# Patient Record
Sex: FEMALE | Race: BLACK OR AFRICAN AMERICAN | NOT HISPANIC OR LATINO | Employment: STUDENT | URBAN - METROPOLITAN AREA
[De-identification: names, ages, dates, MRNs, and addresses within clinical notes are randomized per-mention and may not be internally consistent; named-entity substitution may affect disease eponyms.]

---

## 2019-05-23 ENCOUNTER — OFFICE VISIT (OUTPATIENT)
Dept: FAMILY MEDICINE | Facility: CLINIC | Age: 34
End: 2019-05-23
Payer: MEDICAID

## 2019-05-23 VITALS
HEART RATE: 85 BPM | SYSTOLIC BLOOD PRESSURE: 120 MMHG | DIASTOLIC BLOOD PRESSURE: 80 MMHG | TEMPERATURE: 98 F | WEIGHT: 160 LBS | HEIGHT: 64 IN | BODY MASS INDEX: 27.31 KG/M2 | RESPIRATION RATE: 16 BRPM | OXYGEN SATURATION: 99 %

## 2019-05-23 DIAGNOSIS — F60.3 BORDERLINE PERSONALITY DISORDER: ICD-10-CM

## 2019-05-23 DIAGNOSIS — E78.5 HYPERLIPIDEMIA, UNSPECIFIED HYPERLIPIDEMIA TYPE: ICD-10-CM

## 2019-05-23 DIAGNOSIS — E11.9 TYPE 2 DIABETES MELLITUS WITHOUT COMPLICATION, WITHOUT LONG-TERM CURRENT USE OF INSULIN: ICD-10-CM

## 2019-05-23 DIAGNOSIS — F41.9 ANXIETY: ICD-10-CM

## 2019-05-23 DIAGNOSIS — E03.9 HYPOTHYROIDISM, UNSPECIFIED TYPE: ICD-10-CM

## 2019-05-23 DIAGNOSIS — I10 ESSENTIAL HYPERTENSION: ICD-10-CM

## 2019-05-23 DIAGNOSIS — F31.9 BIPOLAR 1 DISORDER: ICD-10-CM

## 2019-05-23 DIAGNOSIS — E53.8 VITAMIN B 12 DEFICIENCY: ICD-10-CM

## 2019-05-23 DIAGNOSIS — F17.200 SMOKER: ICD-10-CM

## 2019-05-23 DIAGNOSIS — Z76.89 ENCOUNTER TO ESTABLISH CARE: Primary | ICD-10-CM

## 2019-05-23 DIAGNOSIS — E55.9 VITAMIN D DEFICIENCY: ICD-10-CM

## 2019-05-23 PROCEDURE — 99203 OFFICE O/P NEW LOW 30 MIN: CPT | Mod: S$GLB,,, | Performed by: FAMILY MEDICINE

## 2019-05-23 PROCEDURE — 99203 PR OFFICE/OUTPT VISIT, NEW, LEVL III, 30-44 MIN: ICD-10-PCS | Mod: S$GLB,,, | Performed by: FAMILY MEDICINE

## 2019-05-23 NOTE — PROGRESS NOTES
Subjective:       Patient ID: Marcie García is a 33 y.o. female.    Chief Complaint: Establish Care    34 yo F here to establish care. Pt had not been seen by PCP for many years. She did have a psychiatrist but she is not medicated. Pt has anxiety and bipolar and personality disorder. Pt would like a referral.  Pt has no other pMH or meds. She does smoke though. I told her not to smoke and she said she wants to quit.   Pt feels well but she thinks maybe her body is changing somewhat. She will get the labs done and go from there.    Review of Systems   Constitutional: Negative for activity change, chills, fatigue, fever and unexpected weight change.   HENT: Negative for ear pain, rhinorrhea and trouble swallowing.    Eyes: Negative for pain.   Respiratory: Negative for cough, chest tightness, shortness of breath and wheezing.    Cardiovascular: Negative for chest pain and palpitations.   Gastrointestinal: Negative for abdominal distention, abdominal pain, constipation, diarrhea, nausea and vomiting.   Endocrine: Negative for cold intolerance and heat intolerance.   Genitourinary: Negative for dysuria, frequency and urgency.   Musculoskeletal: Negative for myalgias.   Skin: Negative for rash.   Neurological: Negative for dizziness, syncope, light-headedness and headaches.   Hematological: Does not bruise/bleed easily.   Psychiatric/Behavioral: Negative for agitation and confusion.       Objective:      Physical Exam   Constitutional: She appears well-developed.   HENT:   Right Ear: External ear normal.   Left Ear: External ear normal.   Mouth/Throat: Oropharynx is clear and moist.   Eyes: Conjunctivae and EOM are normal.   Neck: Normal range of motion.   Cardiovascular: Normal rate, regular rhythm and intact distal pulses.   Pulmonary/Chest: Effort normal and breath sounds normal.   Abdominal: Soft.   Skin: Skin is warm. Capillary refill takes less than 2 seconds.   Psychiatric: She has a normal mood and affect.        Assessment:       1. Encounter to establish care    2. Smoker    3. Bipolar 1 disorder    4. Anxiety    5. Borderline personality disorder    6. Hyperlipidemia, unspecified hyperlipidemia type    7. Essential hypertension    8. Vitamin D deficiency    9. Type 2 diabetes mellitus without complication, without long-term current use of insulin    10. Hypothyroidism, unspecified type    11. Vitamin B 12 deficiency        Plan:       PROBLEM LIST     Marcie was seen today for establish care.    Diagnoses and all orders for this visit:    Encounter to establish care    Smoker    Bipolar 1 disorder  -     Ambulatory Referral to Psychiatry    Anxiety  -     Ambulatory Referral to Psychiatry    Borderline personality disorder  -     Ambulatory Referral to Psychiatry    Hyperlipidemia, unspecified hyperlipidemia type  -     Lipid panel; Future  -     Lipid panel    Essential hypertension  -     CBC auto differential; Future  -     Comprehensive metabolic panel; Future  -     CBC auto differential  -     Comprehensive metabolic panel    Vitamin D deficiency  -     Vitamin D; Future  -     Vitamin D    Type 2 diabetes mellitus without complication, without long-term current use of insulin  -     Hemoglobin A1c; Future  -     Hemoglobin A1c    Hypothyroidism, unspecified type  -     TSH; Future  -     TSH    Vitamin B 12 deficiency  -     Vitamin B12; Future  -     Vitamin B12

## 2023-12-28 DIAGNOSIS — O09.92 HIGH-RISK PREGNANCY IN SECOND TRIMESTER: Primary | ICD-10-CM

## 2024-01-04 ENCOUNTER — OFFICE VISIT (OUTPATIENT)
Dept: MATERNAL FETAL MEDICINE | Facility: CLINIC | Age: 39
End: 2024-01-04
Payer: MEDICAID

## 2024-01-04 VITALS
BODY MASS INDEX: 34.83 KG/M2 | DIASTOLIC BLOOD PRESSURE: 64 MMHG | HEIGHT: 64 IN | RESPIRATION RATE: 20 BRPM | WEIGHT: 204 LBS | OXYGEN SATURATION: 98 % | HEART RATE: 97 BPM | SYSTOLIC BLOOD PRESSURE: 112 MMHG

## 2024-01-04 DIAGNOSIS — O09.92 HIGH-RISK PREGNANCY IN SECOND TRIMESTER: ICD-10-CM

## 2024-01-04 DIAGNOSIS — F31.9 BIPOLAR 1 DISORDER: Primary | ICD-10-CM

## 2024-01-04 DIAGNOSIS — F41.9 ANXIETY: ICD-10-CM

## 2024-01-04 DIAGNOSIS — O09.522 AMA (ADVANCED MATERNAL AGE) MULTIGRAVIDA 35+, SECOND TRIMESTER: ICD-10-CM

## 2024-01-04 DIAGNOSIS — F60.3 BORDERLINE PERSONALITY DISORDER: ICD-10-CM

## 2024-01-04 PROCEDURE — 99203 OFFICE O/P NEW LOW 30 MIN: CPT | Mod: TH,S$GLB,, | Performed by: OBSTETRICS & GYNECOLOGY

## 2024-01-04 PROCEDURE — 3074F SYST BP LT 130 MM HG: CPT | Mod: CPTII,S$GLB,, | Performed by: OBSTETRICS & GYNECOLOGY

## 2024-01-04 PROCEDURE — 3078F DIAST BP <80 MM HG: CPT | Mod: CPTII,S$GLB,, | Performed by: OBSTETRICS & GYNECOLOGY

## 2024-01-04 PROCEDURE — 76811 OB US DETAILED SNGL FETUS: CPT | Mod: S$GLB,,, | Performed by: OBSTETRICS & GYNECOLOGY

## 2024-01-04 PROCEDURE — 1159F MED LIST DOCD IN RCRD: CPT | Mod: CPTII,S$GLB,, | Performed by: OBSTETRICS & GYNECOLOGY

## 2024-01-04 PROCEDURE — 3008F BODY MASS INDEX DOCD: CPT | Mod: CPTII,S$GLB,, | Performed by: OBSTETRICS & GYNECOLOGY

## 2024-01-04 RX ORDER — PROMETHAZINE HYDROCHLORIDE 25 MG/1
25 TABLET ORAL EVERY 6 HOURS PRN
COMMUNITY
Start: 2023-11-07

## 2024-01-04 RX ORDER — SERTRALINE HYDROCHLORIDE 100 MG/1
100 TABLET, FILM COATED ORAL DAILY
COMMUNITY
Start: 2024-01-03

## 2024-01-04 NOTE — PROGRESS NOTES
Initial Maternal-Fetal Medicine evaluation note     Indications:  1.  Pregnancy at 19 weeks and 0 days   2. Advanced maternal age with a pending NIPT  3. History of 5 prior  deliveries.  4.  History of a stillbirth at 29 weeks in her last pregnancy for uncertain causes.  5.  Maternal psychiatric illness (bipolar disorder, anxiety, ADHD, depression,)     Dear Dr. Garvey,     Thank you very much for asking us see your patient.  She is a 38-year-old  8 para 3225.  She was referred because of advanced maternal age.  She states she had NIPT drawn yesterday so the results are pending.  She was also referred because of complications in prior pregnancies.      Her past obstetrical history:  1. Spontaneous A/B requiring a D&C in the 1st trimester   2.  section at 30 weeks' gestation because of an abruption, preeclampsia and she did have a blood transfusion.  3 early miscarriage with D and C   4-6.  Repeat  sections without major complications.  7. Repeat  section for an intrauterine fetal demise at 29 weeks.    Her past medical history is positive for having major psychiatric disease.  She she is on some trailing only and she states everything is going well at this point.  She has not overly anxious or depressed and is not suicidal.      Family history is negative for anomalies.  Social history is negative for smoking drinking or drug use at the current time.  She is smoking marijuana 3 times per week but no other illicit drugs or alcohol.    Family history is negative for any major congenital anomalies.      Physical findings:  In general is healthy-appearing female in no distress.  Blood pressure is 112/64 pulse is 97.  Face is not edematous and eyes are nonicteric nose and throat are clear.  The abdominal exam is benign without uterine tenderness.  The uterus is appropriate size for the gestational age and is nontender.  No peripheral edema is noted.  Reflexes are 1+ and equal  bilaterally.      Ultrasound findings:  Please note that a separate ultrasound report will be provided and will contain a very detailed description of our findings today.  A brief summary is that everything appears normal.  Normal growth is documented by the biometric measurements.  The amniotic fluid level is normal.  The fetus was actively moving had a normal heart rate.  No placental abnormalities are noted.  It is a anterior placenta but the inferior margin of the placenta does not go anywhere near where a low-transverse incision was noted.  The placenta is not enlarged and does not have any increased vascularity or venous lakes.  Thus, there is no current evidence of a placenta accreta.  Fetal anatomy was limited today because of the early gestational age, no congenital anomalies are noted.    Impression:  1.  This patient has had multiple  sections in the past.  The main risk for this would be a placenta previa or accreta, but at the present time this does not appear to be a risk given the normal ultrasound findings.    2. She has advanced maternal age placing her entered an increased risk for chromosomal defects.  She had NIPT drawn yesterday.  If her returns with a increased risk for chromosomal defects, please refer back immediately to be offered amniocentesis for confirmation.  Otherwise, the risk for chromosomal defects will be considered very low and an amniocentesis would not be indicated.    3. History of preeclampsia in a prior pregnancy.  She is a candidate for low-dose aspirin if she is not already on this.  4.  Major psychiatric illnesses.  She appears to be doing well with this and I feel she can be continued on this or trailing as prescribed.  She will be at increased risk for postpartum depression social need to be monitored for this.  5. I would like see this patient back in 4-6 weeks for follow-up assessment fetal growth well being.      Thanks again for the referral and please call us  if you have any questions.

## 2024-01-04 NOTE — PROGRESS NOTES
"Marcie is here for initial MFM consultation, referred by Dr. Garvey for AMA with pending lab, history of IUFD @ 29 weeks in 2021, history of abruption and preEclampsia at 30 weeks in 2005, and history of C/S delivery x 5.    She is feeling fetal movement.    Marcie denies vaginal bleeding, loss of fluid, recurrent cramping.    She is taking Sertraline 100 mg PO daily, and Phenergan 25 mg PO prn.      Vitals:    01/04/24 1305   BP: 112/64   Pulse: 97   Resp: 20   SpO2: 98%   Weight: 92.5 kg (204 lb)   Height: 5' 4" (1.626 m)      BMI:                    35.02 kg/m^2               "

## 2024-01-04 NOTE — LETTER
January 4, 2024        Chery Garvey MD  [address] [facility] [address]             [place] - Maternal Fetal Medicine  [address]  [place] [address]  Phone: [phone]  Fax: [phone]   Patient: Marcie García   MR Number: [id number]   YOB: 1985   Date of Visit: 1/4/2024       Dear Dr. Garvey:    Thank you for referring Marcie García to me for evaluation. Attached you will find relevant portions of my assessment and plan of care.    If you have questions, please do not hesitate to call me. I look forward to following Marcie García along with you.    Sincerely,      Magdi Altamirano MD            CC  No Recipients    Enclosure

## 2024-01-29 DIAGNOSIS — O09.522 AMA (ADVANCED MATERNAL AGE) MULTIGRAVIDA 35+, SECOND TRIMESTER: Primary | ICD-10-CM

## 2024-01-29 DIAGNOSIS — O09.92 HIGH-RISK PREGNANCY IN SECOND TRIMESTER: ICD-10-CM

## 2024-02-05 ENCOUNTER — PROCEDURE VISIT (OUTPATIENT)
Dept: MATERNAL FETAL MEDICINE | Facility: CLINIC | Age: 39
End: 2024-02-05
Payer: MEDICAID

## 2024-02-05 VITALS
RESPIRATION RATE: 20 BRPM | DIASTOLIC BLOOD PRESSURE: 58 MMHG | HEART RATE: 83 BPM | SYSTOLIC BLOOD PRESSURE: 116 MMHG | BODY MASS INDEX: 35.02 KG/M2 | WEIGHT: 204 LBS | OXYGEN SATURATION: 98 %

## 2024-02-05 DIAGNOSIS — O09.522 AMA (ADVANCED MATERNAL AGE) MULTIGRAVIDA 35+, SECOND TRIMESTER: ICD-10-CM

## 2024-02-05 DIAGNOSIS — O09.92 HIGH-RISK PREGNANCY IN SECOND TRIMESTER: ICD-10-CM

## 2024-02-05 PROCEDURE — 99213 OFFICE O/P EST LOW 20 MIN: CPT | Mod: TH,S$GLB,, | Performed by: OBSTETRICS & GYNECOLOGY

## 2024-02-05 PROCEDURE — 76816 OB US FOLLOW-UP PER FETUS: CPT | Mod: S$GLB,,, | Performed by: OBSTETRICS & GYNECOLOGY

## 2024-02-05 RX ORDER — ASPIRIN 81 MG/1
81 TABLET ORAL DAILY
COMMUNITY

## 2024-02-05 NOTE — PROGRESS NOTES
Follow-up [facility] consultation note:  Neville Bingham MD    Reason for consultation:     1. Pregnancy at 23 weeks' gestation  2. Five prior C-sections  3. Advanced maternal age with low risk cell free DNA analysis  4. Prior fetal death at 29 weeks   5. Maternal psychiatric disease      Dear Chery,    Today, I had the opportunity to see your patient in follow-up at the [facility].  I greatly appreciate your request for follow-up imaging and consultation.  Your patient is a 38-year-old multigravida with 5 prior C-sections.  Additionally she has significant psychiatric disease.  She appears to be stable today without maternal are obstetric complications.  She does complain of left calf tenderness.    Physical examination    General:  Is a well-developed well-nourished female in no apparent distress  Vital signs:  Blood pressure 116/58, pulse 83, respirations 20, weight 204 lb.  HEENT:  Grossly within normal limits.  There is no facial edema.  The sclera appears normal.  Abdomen:  Benign exam.  The uterus is nontender to palpation.  The uterus is appropriate size.  Extremities:  No ankle edema is palpable.  Neither calf showed any tenderness, cords, or positive Homans sign  Skin:  There is no rash or lesions.  Neuro:  Grossly intact  Psych:  The patient is appropriate for both mood and affect.      Imaging:  [facility] imaging was repeated today.  A full ultrasound report has been created in the [employer] system and a copy will be placed in the EMR and will also be forwarded to you separately.  In summary, fetal growth appears normal as does the amniotic fluid volume.  The anatomic survey is without obvious abnormality.  However the right ventricular outflow tract and aortic arch was difficult to see adequately.    Counseling:  Your patient was counseled I felt all was going along well with both mother and baby.  She does not appear to have a high probability of a venous thrombosis.  It could be a  muscle strain.  If the problem persists or worsens then she should seek out the care of her doctor or go to an urgent care to have further assessment.      Impression:  From a pregnancy point of view the patient appears to be doing well.  The maternal condition appears stable.  Psychologically she appears to be stable.  The fetal assessments reassuring with normal growth and fluid      Recommendations:    1. With your permission we will see the patient back in 5 weeks.  At that time we will reassess the right ventricular outflow tract to try to make sure that we complete the anatomic survey.    2. The patient has a history of smoking.  She states that she stopped.  She was encouraged to remain smoke-free.    Please feel free to call me if you have any questions about the care of your patient.  [facility] can be reached 24 hours a day at [phone].  I greatly appreciate the opportunity to participate in the care of your patient.    Sincerely, Neville Bingham MD        Today's visit requiring approximately 30 minutes of my time.  Half of which was face-to-face with the patient.

## 2024-02-05 NOTE — PROGRESS NOTES
Marcie is here for followup [facility] consultation for previous c/s x 5, history of IUFD at 29 weeks, AMA with negative NIPT, and psych disorders referred by Dr. Garvey.    She is feeling fetal movement.    Marcie denies vaginal bleeding, loss of fluid, recurrent contractions. She mentioned that her L calf hurts when walking; no warmth, redness, or tenderness when palpated, it is slightly larger than her R calf.    She is taking ASA 81 mg PO daily and Zoloft 100 mg PO daily.    Vitals:    02/05/24 1017   BP: (!) 116/58   Pulse: 83   Resp: 20   SpO2: 98%   Weight: 92.5 kg (204 lb)     BMI:                    35.02 kg/m^2

## 2024-02-05 NOTE — LETTER
February 5, 2024        Chery Garvey MD  [address] [facility] [address]             [place] - Maternal Fetal Medicine  [address]  [place] [address]  Phone: [phone]  Fax: [phone]   Patient: Marcie García   MR Number: [id number]   YOB: 1985   Date of Visit: 2/5/2024       Dear Dr. Garvey:    Thank you for referring Marcie García to me for evaluation. Below are the relevant portions of my assessment and plan of care.            If you have questions, please do not hesitate to call me. I look forward to following Marcie along with you.    Sincerely,      Neville Bingham MD           CC  No Recipients

## 2024-02-27 DIAGNOSIS — O09.522 AMA (ADVANCED MATERNAL AGE) MULTIGRAVIDA 35+, SECOND TRIMESTER: Primary | ICD-10-CM

## 2024-02-27 DIAGNOSIS — O09.292 PRIOR PREGNANCY WITH FETAL DEMISE AND CURRENT PREGNANCY IN SECOND TRIMESTER: ICD-10-CM

## 2024-03-11 ENCOUNTER — PROCEDURE VISIT (OUTPATIENT)
Dept: MATERNAL FETAL MEDICINE | Facility: CLINIC | Age: 39
End: 2024-03-11
Payer: MEDICAID

## 2024-03-11 VITALS
DIASTOLIC BLOOD PRESSURE: 64 MMHG | HEART RATE: 97 BPM | WEIGHT: 211 LBS | OXYGEN SATURATION: 98 % | RESPIRATION RATE: 20 BRPM | SYSTOLIC BLOOD PRESSURE: 110 MMHG | BODY MASS INDEX: 36.22 KG/M2

## 2024-03-11 DIAGNOSIS — O09.522 AMA (ADVANCED MATERNAL AGE) MULTIGRAVIDA 35+, SECOND TRIMESTER: ICD-10-CM

## 2024-03-11 DIAGNOSIS — O09.292 PRIOR PREGNANCY WITH FETAL DEMISE AND CURRENT PREGNANCY IN SECOND TRIMESTER: ICD-10-CM

## 2024-03-11 PROCEDURE — 76819 FETAL BIOPHYS PROFIL W/O NST: CPT | Mod: S$GLB,,, | Performed by: OBSTETRICS & GYNECOLOGY

## 2024-03-11 PROCEDURE — 99213 OFFICE O/P EST LOW 20 MIN: CPT | Mod: TH,S$GLB,, | Performed by: OBSTETRICS & GYNECOLOGY

## 2024-03-11 PROCEDURE — 76816 OB US FOLLOW-UP PER FETUS: CPT | Mod: S$GLB,,, | Performed by: OBSTETRICS & GYNECOLOGY

## 2024-03-11 NOTE — PROGRESS NOTES
Follow-up Maternal-Fetal Medicine evaluation    Indications:  1.  Pregnancy at 28 weeks 4 days   2. History of 5 prior  deliveries   3. History of a fetal demise at 29 weeks  4.  Anxiety and depression currently on Zoloft  5.  Advanced maternal age with a low risk NIPT result    Dear Dr. Garvey,     Thank you very much for asking us see your patient again.  She returns today for follow-up assessment.  She has a  8 para 3224.  She is currently 20 weeks and 4 days.  She had a prior stillbirth, which she states was caused by her placenta detached.  I assume this was an abruption.  Today she has not experiencing any symptoms of recurrence of abruption.  Specifically, she denies pain, vaginal bleeding, and she is feeling a lot of fetal movement.    She has had 5 prior C-sections.  Ultrasounds performed earlier in the pregnancy did not show any evidence of placenta previa or placenta accreta spectrum disorder.    Physical findings:   General: Healthy-appearing female in no distress.  Her blood pressure is 110/64 pulse is 97   HEENT: Her face is not edematous and eyes are nonicteric.  Nose and throat are clear.    Abdomen:  Soft nondistended without hepatosplenomegaly or uterine tenderness.  Extremities:  Without clubbing cyanosis edema.  Reflexes are 1+ and equal bilaterally.      Ultrasound findings:  Please note that a separate ultrasound report will be provided to you and it will contain a detailed description of our findings today.  A brief summary is that the fetus is in a cephalic presentation with adequate fluid.  The fetus was actively moving with breathing and tone for a biophysical profile score of 8/8.  Biometric measurements document normal growth.  The placenta shows no evidence of placenta previa, or abruption.  The fetal anatomy did not show any anomalies.  However, the spine was anterior throughout our entire evaluation, so adequate views of all the fetal anatomy was not possible.   However, ultrasounds performed earlier did not show any anomalies.    Impression and recommendations:  1. Pregnancy at 28 weeks and 4 days   2. Advanced maternal age.  Given the normal ultrasound findings today, and the low risk NIPT results, the risk for chromosomal defects is felt to be very low   3. History of 5 prior  deliveries.  Fortunately the placenta did not implant near the uterine scar, and there is no evidence of placenta accreta or placenta previa.  4. The ultrasound documents normal fetal growth and well-being.  She did have a prior stillbirth.  Because of this, would recommend  fetal testing beginning at 32 weeks.  This should include biophysical profiles and/or nonstress test.  5.  With your permission we will see her back for follow-up assessment of fetal growth and well-being in 4 weeks.      Thanks again for the referral and please call us with any questions.

## 2024-03-11 NOTE — LETTER
March 11, 2024        Chery Garvey MD  [address] [facility] [address]             [place] - Maternal Fetal Medicine  [address]  [place] [address]  Phone: [phone]  Fax: [phone]   Patient: Marcie García   MR Number: [id number]   YOB: 1985   Date of Visit: 3/11/2024       Dear Dr. Garvey:    Thank you for referring Marcie García to me for evaluation. Below are the relevant portions of my assessment and plan of care.            If you have questions, please do not hesitate to call me. I look forward to following Marcie along with you.    Sincerely,      Magdi Hager MD           CC  No Recipients

## 2024-03-11 NOTE — PROGRESS NOTES
"Marcie is here for followup [facility] consultation for history of IUFD at 29 weeks, previous c/s x 5, AMA with negative NIPT, and multiple psych disorders referred by Dr. Garvey.    She is feeling fetal movement.    Marcie denies vaginal bleeding, loss of fluid, recurrent contractions.    She is taking ASA 81 mg PO daily, Zoloft 100 mg PO daily, and occasionally takes Phenergan "but not often."    Vitals:    03/11/24 0836   BP: 110/64   Pulse: 97   Resp: 20   SpO2: 98%   Weight: 95.7 kg (211 lb)     BMI:                    36.22 kg/m^2     "

## 2024-03-25 DIAGNOSIS — O09.293 PRIOR PREGNANCY WITH FETAL DEMISE AND CURRENT PREGNANCY IN THIRD TRIMESTER: ICD-10-CM

## 2024-03-25 DIAGNOSIS — O09.523 MULTIGRAVIDA OF ADVANCED MATERNAL AGE IN THIRD TRIMESTER: Primary | ICD-10-CM

## 2024-03-25 DIAGNOSIS — O09.299 HX OF PREECLAMPSIA, PRIOR PREGNANCY, CURRENTLY PREGNANT: ICD-10-CM

## 2024-04-08 ENCOUNTER — PROCEDURE VISIT (OUTPATIENT)
Dept: MATERNAL FETAL MEDICINE | Facility: CLINIC | Age: 39
End: 2024-04-08
Payer: MEDICAID

## 2024-04-08 VITALS
SYSTOLIC BLOOD PRESSURE: 112 MMHG | WEIGHT: 211 LBS | RESPIRATION RATE: 20 BRPM | HEART RATE: 98 BPM | OXYGEN SATURATION: 98 % | BODY MASS INDEX: 36.22 KG/M2 | DIASTOLIC BLOOD PRESSURE: 64 MMHG

## 2024-04-08 DIAGNOSIS — O09.299 HX OF PREECLAMPSIA, PRIOR PREGNANCY, CURRENTLY PREGNANT: ICD-10-CM

## 2024-04-08 DIAGNOSIS — O09.523 MULTIGRAVIDA OF ADVANCED MATERNAL AGE IN THIRD TRIMESTER: ICD-10-CM

## 2024-04-08 DIAGNOSIS — O09.293 PRIOR PREGNANCY WITH FETAL DEMISE AND CURRENT PREGNANCY IN THIRD TRIMESTER: ICD-10-CM

## 2024-04-08 PROCEDURE — 76816 OB US FOLLOW-UP PER FETUS: CPT | Mod: S$GLB,,, | Performed by: OBSTETRICS & GYNECOLOGY

## 2024-04-08 PROCEDURE — 76819 FETAL BIOPHYS PROFIL W/O NST: CPT | Mod: S$GLB,,, | Performed by: OBSTETRICS & GYNECOLOGY

## 2024-04-08 PROCEDURE — 99213 OFFICE O/P EST LOW 20 MIN: CPT | Mod: TH,S$GLB,, | Performed by: OBSTETRICS & GYNECOLOGY

## 2024-04-08 NOTE — LETTER
April 8, 2024        Chery Garvey MD  [address] [facility] [address]             [place] - Maternal Fetal Medicine  [address]  [place] [address]  Phone: [phone]  Fax: [phone]   Patient: Marcie García   MR Number: [id number]   YOB: 1985   Date of Visit: 4/8/2024       Dear Dr. Garvey:    Thank you for referring Marcie García to me for evaluation. Below are the relevant portions of my assessment and plan of care.            If you have questions, please do not hesitate to call me. I look forward to following Marcie along with you.    Sincerely,      Neville Bingham MD           CC  No Recipients

## 2024-04-08 NOTE — PROGRESS NOTES
Marcie is here for followup [facility] consultation for AMA with negative NIPT, history of C/S x 5, history of IUFD at 29 weeks, multiple psych disorders, and former smoker referred by Dr. Garvey.    She is feeling fetal movement.    Marcie denies vaginal bleeding, loss of fluid, recurrent contractions.    She states she is not taking any medications at this time. She has not been back to Dr. Rhodes to get her Zoloft refilled.    Vitals:    04/08/24 0913   BP: 112/64   Pulse: 98   Resp: 20   SpO2: 98%   Weight: 95.7 kg (211 lb)     BMI:                    36.22 kg/m^2

## 2024-04-08 NOTE — PROGRESS NOTES
Follow-up [facility] consultation note:  Neville Bingham MD    Reason for consultation:     1. Pregnancy at 32-,1/2 weeks' gestation  2. Five prior C-sections  3. Prior fetal death at 29 weeks' gestation  4. Grand multiparity   5. Advanced maternal age   6. Low risk cell free DNA analysis   7. Tobacco use during pregnancy     Dear Dr. Garvey    Today, I had the opportunity to see your patient in follow-up at the [facility].  I greatly appreciate your request for follow-up imaging and consultation.  Your patient is a 38-year-old  8 para 3225 at 32-,1/2 weeks' gestation.  Today she denies leakage of fluid, spotting, and regular uterine contractions.  Fetal movement is perceived daily.    Physical examination    General:  This is a well-developed well-nourished female in no apparent distress  Vital signs:  Blood pressure 116/58, pulse 83, respirations 20, weight 204 lb  HEENT:  Grossly within normal limits.  There is no facial edema.  The sclera appears normal.  Abdomen:  Benign exam.  The uterus is nontender to palpation.  The uterus is appropriate size.  Extremities:  No ankle edema is palpable.  Skin:  There is no rash or lesions.  Neuro:  Grossly intact  Psych:  The patient is appropriate for both mood and affect.      Imaging:  [facility] imaging was repeated today.  A full ultrasound report has been created in the [employer] system and a copy will be placed in the EMR and will also be forwarded to you separately.  In summary, imaging today is reassuring with normal fetal growth, anatomy, and fluid.  The biophysical profile score of 8/8 is reassuring.    Counseling:  I counseled the patient that I recommend delivery in the 37th week of pregnancy secondary to a combination of factors.  She is increased risk for fetal death secondary to a prior fetal death, advanced maternal age, and  ancestry.  Additionally, she has had 5 prior C-sections.  There are a couple of studies in the  literature that indicate that in women with multiple C-sections that delivery before 39 weeks improves outcome.  Post counseling she expressed understanding.    Impression:  High-risk pregnancy secondary to a prior fetal death, advanced maternal age, and 5 prior C-sections.  Additionally, the patient has had some tobacco use during pregnancy.    Recommendations:    1. I would suggest delivery in the 37th week of pregnancy.    2. The patient should continue with weekly fetal surveillance.  I favor a weekly biophysical profile    3. No follow-up in the Maternal-Fetal Medicine Center is advised to scheduled.      Please feel free to call me if you have any questions about the care of your patient.  The [facility] MFM group can be reached 24 hours a day at [phone].  I greatly appreciate the opportunity to participate in the care of your patient.    Sincerely, Neville Bingham MD